# Patient Record
Sex: MALE | Race: WHITE
[De-identification: names, ages, dates, MRNs, and addresses within clinical notes are randomized per-mention and may not be internally consistent; named-entity substitution may affect disease eponyms.]

---

## 2020-06-11 ENCOUNTER — HOSPITAL ENCOUNTER (EMERGENCY)
Dept: HOSPITAL 75 - ER | Age: 45
Discharge: HOME | End: 2020-06-11
Payer: SELF-PAY

## 2020-06-11 VITALS — HEIGHT: 72.01 IN | WEIGHT: 219.8 LBS | BODY MASS INDEX: 29.77 KG/M2

## 2020-06-11 VITALS — DIASTOLIC BLOOD PRESSURE: 94 MMHG | SYSTOLIC BLOOD PRESSURE: 147 MMHG

## 2020-06-11 DIAGNOSIS — L23.9: Primary | ICD-10-CM

## 2020-06-11 DIAGNOSIS — T38.0X5A: ICD-10-CM

## 2020-06-11 PROCEDURE — 99283 EMERGENCY DEPT VISIT LOW MDM: CPT

## 2020-06-11 NOTE — ED INTEGUMENTARY GENERAL
General


Chief Complaint:  Allergic Reaction


Stated Complaint:  ALLERGIC REACTION


Nursing Triage Note:  


itching/hives, feet swelling.





History of Present Illness


Date Seen by Provider:  Jun 11, 2020


Time Seen by Provider:  21:20


Initial Comments


45 year old male presents for rash that started 2 days ago. He received a 

steroid shot 6/9/20 and is taking Oral Prednisone but continuing to have 

pruritus. No dyspnea, fever, tongue or lip swelling.  He has tried Benadryl 

Cream with no relief.


Timing/Duration:  getting worse


Modifying Factors:  improves with scratching


Associated Symptoms:  change in skin texture, hives, rash





Allergies and Home Medications


Allergies


Coded Allergies:  


     No Known Drug Allergies (Unverified , 9/4/11)





Home Medications


Dicloxacillin Sodium 500 Mg Capsule, 1 EACH PO QID, (Reported)





Patient Home Medication List


Home Medication List Reviewed:  Yes





Review of Systems


Review of Systems


Constitutional:  no symptoms reported, see HPI


Skin:  see HPI, rash





All Other Systems Reviewed


Negative Unless Noted:  Yes





Past Medical-Social-Family Hx


Past Med/Social Hx:  Reviewed Nursing Past Med/Soc Hx


Patient Social History


Alcohol Use:  Occasionally Uses


Recreational Drug Use:  No


Smoking Status:  Never a Smoker


Type Used:  Smokeless Tobacco


2nd Hand Smoke Exposure:  No


Recent Foreign Travel:  No


Contact w/Someone Who Travel:  No


Recent Infectious Disease Expo:  No


Recent Hopitalizations:  No


Physical Abuse:  No


Sexual Abuse:  No


Mistreated:  No


Fear:  No





Immunizations Up To Date


Tetanus Booster (TDap):  Unknown





Seasonal Allergies


Seasonal Allergies:  No





Past Medical History


Surgeries:  Yes (INGUINAL HERNIA REPAIR, DEVIATED SEPTUM REPAIR)


Appendectomy


Respiratory:  No


Cardiac:  No


Neurological:  No


Reproductive Disorders:  No


Genitourinary:  No


Gastrointestinal:  No


Musculoskeletal:  No


Endocrine:  No


HEENT:  No


Cancer:  No


Psychosocial:  No


Integumentary:  Yes


Recent Skin Changes


Blood Disorders:  No





Physical Exam


Vital Signs





Vital Signs - First Documented








 6/11/20





 21:15


 


Temp 36.9


 


Pulse 119


 


Resp 20


 


B/P (MAP) 147/94 (111)


 


Pulse Ox 98


 


O2 Delivery Room Air





Capillary Refill : Less Than 3 Seconds


General Appearance:  WD/WN, no apparent distress


HEENT:  PERRL/EOMI, normal ENT inspection, TMs normal, pharynx normal


Neck:  non-tender, full range of motion, supple


Cardiovascular:  normal peripheral pulses, regular rate, rhythm


Respiratory:  chest non-tender, lungs clear, normal breath sounds, no 

respiratory distress, no accessory muscle use


Neurologic/Psychiatric:  no motor/sensory deficits, alert, normal mood/affect, 

oriented x 3


Skin:  rash


Skin Problem Location:  generalized, upper extremities, lower extremities


Skin Problem Character:  erythema, macules, papules





Progress/Results/Core Measures


Results/Orders


My Orders





Orders - LOVELY ROJAS


Diphenhydramine Tablet (Benadryl Tablet) (6/11/20 21:30)


Famotidine Tablet (Pepcid Tablet) (6/11/20 21:30)





Medications Given in ED





Current Medications








 Medications  Dose


 Ordered  Sig/Chris


 Route  Start Time


 Stop Time Status Last Admin


Dose Admin


 


 Diphenhydramine


 HCl  50 mg  ONCE  ONCE


 PO  6/11/20 21:30


 6/11/20 21:31 DC 6/11/20 21:34


50 MG


 


 Famotidine  20 mg  ONCE  ONCE


 PO  6/11/20 21:30


 6/11/20 21:31 DC 6/11/20 21:34


20 MG








Vital Signs/I&O











 6/11/20





 21:15


 


Temp 36.9


 


Pulse 119


 


Resp 20


 


B/P (MAP) 147/94 (111)


 


Pulse Ox 98


 


O2 Delivery Room Air














Blood Pressure Mean:                    111











Departure


Impression





   Primary Impression:  


   Allergic reaction


   Qualified Codes:  T78.40XA - Allergy, unspecified, initial encounter


   Additional Impression:  


   Contact dermatitis


   Qualified Codes:  L23.9 - Allergic contact dermatitis, unspecified cause


Disposition:  01 HOME, SELF-CARE


Condition:  Improved





Departure-Patient Inst.


Decision time for Depature:  21:45


Referrals:  


NO,LOCAL PHYSICIAN (PCP/Family)


Primary Care Physician


Patient Instructions:  Contact Dermatitis (DC), Skin Rash (DC)





Add. Discharge Instructions:  


Continue to take the prednisone as prescribed.


Take Benadryl 25-50 mg every 8 hours for itching. 


Take Pepcid 20 mg twice daily. 


Take Zyrtec 1 daily. 


Avoid sweating or being in hot environment. 


Follow up with your Primary Care Provider, if not improving or worsens. 


Return to ER for difficulty breathing, swelling of lips or tongue, fever greater

than 101 or new, urgent health care needs. 





All discharge instructions reviewed with patient and/or family. Voiced 

understanding.











LOVELY ROJAS                  Jun 11, 2020 21:40